# Patient Record
Sex: MALE | Race: ASIAN | Employment: FULL TIME | URBAN - METROPOLITAN AREA
[De-identification: names, ages, dates, MRNs, and addresses within clinical notes are randomized per-mention and may not be internally consistent; named-entity substitution may affect disease eponyms.]

---

## 2017-09-14 ENCOUNTER — OFFICE VISIT (OUTPATIENT)
Dept: FAMILY MEDICINE CLINIC | Age: 57
End: 2017-09-14

## 2017-09-14 VITALS
RESPIRATION RATE: 16 BRPM | WEIGHT: 170 LBS | SYSTOLIC BLOOD PRESSURE: 113 MMHG | HEIGHT: 65 IN | HEART RATE: 68 BPM | TEMPERATURE: 98 F | DIASTOLIC BLOOD PRESSURE: 77 MMHG | OXYGEN SATURATION: 96 % | BODY MASS INDEX: 28.32 KG/M2

## 2017-09-14 DIAGNOSIS — H90.3 SENSORINEURAL HEARING LOSS (SNHL) OF BOTH EARS: ICD-10-CM

## 2017-09-14 DIAGNOSIS — H54.3 VISION LOSS, BILATERAL: ICD-10-CM

## 2017-09-14 DIAGNOSIS — H43.393 VITREOUS FLOATERS OF BOTH EYES: ICD-10-CM

## 2017-09-14 DIAGNOSIS — Z76.89 ESTABLISHING CARE WITH NEW DOCTOR, ENCOUNTER FOR: Primary | ICD-10-CM

## 2017-09-14 DIAGNOSIS — Z12.11 ENCOUNTER FOR FIT (FECAL IMMUNOCHEMICAL TEST) SCREENING: ICD-10-CM

## 2017-09-14 DIAGNOSIS — Z00.00 ENCOUNTER FOR ANNUAL PHYSICAL EXAM: ICD-10-CM

## 2017-09-14 DIAGNOSIS — Z28.21 REFUSED INFLUENZA VACCINE: ICD-10-CM

## 2017-09-14 DIAGNOSIS — Z23 ENCOUNTER FOR IMMUNIZATION: ICD-10-CM

## 2017-09-14 DIAGNOSIS — Z13.220 ENCOUNTER FOR SCREENING FOR LIPID DISORDER: ICD-10-CM

## 2017-09-14 DIAGNOSIS — Z11.59 ENCOUNTER FOR HEPATITIS C SCREENING TEST FOR LOW RISK PATIENT: ICD-10-CM

## 2017-09-14 NOTE — MR AVS SNAPSHOT
Visit Information Date & Time Provider Department Dept. Phone Encounter #  
 9/14/2017  9:15 AM Case Mills MD [address] [place] [address] [phone] [id number] Upcoming Health Maintenance Date Due Hepatitis C Screening 1960 DTaP/Tdap/Td series (1 - Tdap) 10/14/1982 FOBT Q 1 YEAR AGE 50-75 10/14/2011 INFLUENZA AGE 9 TO ADULT 8/1/2017 Allergies as of 9/14/2017  Review Complete On: 9/14/2017 By: Deric Vázquez LPN No Known Allergies Current Immunizations  Reviewed on 9/14/2017 Name Date Tdap 9/14/2017 Reviewed by Deric Vázquez LPN on 1/72/0670 at  9:46 AM  
You Were Diagnosed With   
  
 Codes Comments Encounter for FIT (fecal immunochemical test) screening    -  Primary ICD-10-CM: Z12.11 ICD-9-CM: V76.51 Encounter for immunization     ICD-10-CM: V56 ICD-9-CM: V03.89 Encounter for screening for lipid disorder     ICD-10-CM: Z13.220 ICD-9-CM: V77.91 Encounter for hepatitis C screening test for low risk patient     ICD-10-CM: Z11.59 
ICD-9-CM: V73.89 Vision loss, bilateral     ICD-10-CM: H54.3 ICD-9-CM: 369.3 Vitreous floaters of both eyes     ICD-10-CM: U40.664 ICD-9-CM: 379.24 Vitals BP Pulse Temp Resp Height(growth percentile) Weight(growth percentile) 113/77 68 98 °F (36.7 °C) (Oral) 16 5' 5\" (1.651 m) 170 lb (77.1 kg) SpO2 BMI Smoking Status 96% 28.29 kg/m2 Never Smoker Vitals History BMI and BSA Data Body Mass Index Body Surface Area  
 28.29 kg/m 2 1.88 m 2 Preferred Pharmacy Pharmacy Name Phone [facility] [address] [place] [address] Drive, [address] [place] [address] [address] [facility] [phone] Your Updated Medication List  
  
Notice  As of 9/14/2017  9:47 AM  
 You have not been prescribed any medications. We Performed the Following HEPATITIS C AB [77581 CPT(R)] LIPID PANEL [36954 CPT(R)] OCCULT BLOOD, IMMUNOASSAY (FIT) [id number] CPT(R)] [place] IMMUNIZ ADMIN,1 SINGLE/COMB VAC/TOXOID [id number] CPT(R)] REFERRAL TO OPHTHALMOLOGY [REF57 Custom] Comments:  
 Please evaluate for visual loss, visual floaters. TETANUS, DIPHTHERIA TOXOIDS AND ACELLULAR PERTUSSIS VACCINE (TDAP), IN INDIVIDS. >=7, IM [id number] CPT(R)] Referral Information Referral ID Referred By Referred To  
  
 [id number] IDANIA APARICIO Not Available Visits Status Start Date End Date 1 New Request 9/14/17 9/14/18 If your referral has a status of pending review or denied, additional information will be sent to support the outcome of this decision. Patient Instructions [employer] (multiple locations across [facility]) 
[phone] Please call the clinic back and speak to [facility] after you make the appointment. Well Visit, Men 48 to 72: Care Instructions Your Care Instructions Physical exams can help you stay healthy. Your doctor has checked your overall health and may have suggested ways to take good care of yourself. He or she also may have recommended tests. At home, you can help prevent illness with healthy eating, regular exercise, and other steps. Follow-up care is a key part of your treatment and safety. Be sure to make and go to all appointments, and call your doctor if you are having problems. It's also a good idea to know your test results and keep a list of the medicines you take. How can you care for yourself at home? · Reach and stay at a healthy weight. This will lower your risk for many problems, such as obesity, diabetes, heart disease, and high blood pressure. · Get at least 30 minutes of exercise on most days of the week. Walking is a good choice. You also may want to do other activities, such as running, swimming, cycling, or playing tennis or team sports. · Do not smoke. Smoking can make health problems worse.  If you need help quitting, talk to your doctor about stop-smoking programs and medicines. These can increase your chances of quitting for good. · Protect your skin from too much sun. When you're outdoors from 10 a.m. to 4 p.m., stay in the shade or cover up with clothing and a hat with a wide brim. Wear sunglasses that block UV rays. Even when it's cloudy, put broad-spectrum sunscreen (SPF 30 or higher) on any exposed skin. · See a dentist one or two times a year for checkups and to have your teeth cleaned. · Wear a seat belt in the car. · Limit alcohol to 2 drinks a day. Too much alcohol can cause health problems. Follow your doctor's advice about when to have certain tests. These tests can spot problems early. · Cholesterol. Your doctor will tell you how often to have this done based on your overall health and other things that can increase your risk for heart attack and stroke. · Blood pressure. Have your blood pressure checked during a routine doctor visit. Your doctor will tell you how often to check your blood pressure based on your age, your blood pressure results, and other factors. · Prostate exam. Talk to your doctor about whether you should have a blood test (called a PSA test) for prostate cancer. Experts disagree on whether men should have this test. Some experts recommend that you discuss the benefits and risks of the test with your doctor. · Diabetes. Ask your doctor whether you should have tests for diabetes. · Vision. Some experts recommend that you have yearly exams for glaucoma and other age-related eye problems starting at age 48. · Hearing. Tell your doctor if you notice any change in your hearing. You can have tests to find out how well you hear. · Colon cancer. You should begin tests for colon cancer at age 48. You may have one of several tests. Your doctor will tell you how often to have tests based on your age and risk.  Risks include whether you already had a precancerous polyp removed from your colon or whether your parent, brother, sister, or child has had colon cancer. · Heart attack and stroke risk. At least every 4 to 6 years, you should have your risk for heart attack and stroke assessed. Your doctor uses factors such as your age, blood pressure, cholesterol, and whether you smoke or have diabetes to show what your risk for a heart attack or stroke is over the next 10 years. · Abdominal aortic aneurysm. Ask your doctor whether you should have a test to check for an aneurysm. You may need a test if you ever smoked or if your parent, brother, sister, or child has had an aneurysm. When should you call for help? Watch closely for changes in your health, and be sure to contact your doctor if you have any problems or symptoms that concern you. Where can you learn more? Go to http://andrae-opal.info/. Enter G570 in the search box to learn more about \"Well Visit, Men 48 to 72: Care Instructions. \" Current as of: July 19, 2016 Content Version: 11.3 © 3143-4577 [employer]. Care instructions adapted under license by [employer] (which disclaims liability or warranty for this information). If you have questions about a medical condition or this instruction, always ask your healthcare professional. [place] [address] any warranty or liability for your use of this information. Introducing [facility]! [employer] introduces iovox patient portal. Now you can access parts of your medical record, email your doctor's office, and request medication refills online. 1. In your internet browser, go to [email or web address] 2. Click on the First Time User? Click Here link in the Sign In box. You will see the New Member Sign Up page. 3. Enter your iovox Access Code exactly as it appears below. You will not need to use this code after youve completed the sign-up process.  If you do not sign up before the expiration date, you must request a new code. · Aginova Access Code: [email or web address] Expires: 12/13/2017  9:24 AM 
 
4. Enter the last four digits of your Social Security Number (xxxx) and Date of Birth (mm/dd/yyyy) as indicated and click Submit. You will be taken to the next sign-up page. 5. Create a Aginova ID. This will be your Aginova login ID and cannot be changed, so think of one that is secure and easy to remember. 6. Create a Aginova password. You can change your password at any time. 7. Enter your Password Reset Question and Answer. This can be used at a later time if you forget your password. 8. Enter your e-mail address. You will receive e-mail notification when new information is available in [address] 9. Click Sign Up. You can now view and download portions of your medical record. 10. Click the Download Summary menu link to download a portable copy of your medical information. If you have questions, please visit the Frequently Asked Questions section of the Aginova website. Remember, Aginova is NOT to be used for urgent needs. For medical emergencies, dial 911. Now available from your iPhone and Android! Please provide this summary of care documentation to your next provider. Your primary care clinician is listed as NONE. If you have any questions after today's visit, please call [phone].

## 2017-09-14 NOTE — PROGRESS NOTES
[address] [facility] with [facility] and [facility]       Subjective  Jeremias Denise is an 55 y.o. male who presents for complete physical exam and to establish care with our practice. He was previously followed by Dr. Liane Pelayo. Doing well overall. Complaining of some bilateral visual loss and sensation of floaters in his eyes. Asking for a referral to see ophthalmologist--has not seen in the past.  Also complaining of bilateral hearing loss--noted by Dr. Liane Pelayo in the past, patient thought to be an appropriate candidate for hearing aids. Diet: good. Eats plenty of fruits and vegetables. Rarely eats fast/processed foods. Exercise: walks 2 miles daily. Allergies - reviewed:   No Known Allergies      Medications - reviewed:  No current outpatient prescriptions on file. No current facility-administered medications for this visit. Past Medical History - reviewed:  Past Medical History:   Diagnosis Date    Sensorineural hearing loss          Past Surgical History - reviewed:  History reviewed. No pertinent surgical history. Family History - reviewed:  History reviewed. No pertinent family history. Social History - reviewed:  Social History     Social History    Marital status:      Spouse name: N/A    Number of children: N/A    Years of education: N/A     Occupational History    Not on file. Social History Main Topics    Smoking status: Never Smoker    Smokeless tobacco: Never Used    Alcohol use No    Drug use: Not on file    Sexual activity: Yes     Partners: Female      Comment:   Cheondoism Ukrainian      Other Topics Concern    Not on file     Social History Narrative         Immunizations - reviewed:   Immunization History   Administered Date(s) Administered    Tdap 09/14/2017     Flu: declines today. Tdap: Does not remember last one. To receive today.   Pneumovax: n/a  Zostervax: n/a      Health Maintenance reviewed -  Colonoscopy declined in the past, declines again today. Agreeable to FIT testing. DEXA scan n/a  Hepatitis C testing to do today. Lung cancer screening n/a      Review of Systems  Review of Systems   Constitutional: Negative for chills and fever. Respiratory: Negative for shortness of breath. Cardiovascular: Negative for chest pain. Physical Exam    Visit Vitals    /77    Pulse 68    Temp 98 °F (36.7 °C) (Oral)    Resp 16    Ht 5' 5\" (1.651 m)    Wt 170 lb (77.1 kg)    SpO2 96%    BMI 28.29 kg/m2     Physical Exam   Constitutional: He is oriented to person, place, and time. He appears well-developed and well-nourished. No distress. HENT:   Head: Normocephalic. Right Ear: Hearing, external ear and ear canal normal. Tympanic membrane is scarred. Left Ear: Hearing, external ear and ear canal normal. Tympanic membrane is scarred. Eyes: Conjunctivae, EOM and lids are normal. Pupils are equal, round, and reactive to light. Lids are everted and swept, no foreign bodies found. Fundoscopic exam:       The right eye shows no arteriolar narrowing, no AV nicking, no exudate, no hemorrhage and no papilledema. The right eye shows red reflex. The left eye shows no arteriolar narrowing, no AV nicking, no exudate, no hemorrhage and no papilledema. The left eye shows red reflex. Neck: Normal range of motion. Cardiovascular: Normal rate, regular rhythm and intact distal pulses. Exam reveals no gallop and no friction rub. No murmur heard. Pulmonary/Chest: Effort normal and breath sounds normal. No respiratory distress. He has no wheezes. He has no rales. He exhibits no tenderness. Abdominal: Soft. Bowel sounds are normal. He exhibits no distension and no mass. There is no tenderness. There is no rebound and no guarding. Lymphadenopathy:     He has no cervical adenopathy. Neurological: He is alert and oriented to person, place, and time. No cranial nerve deficit.    Skin: Skin is warm and dry. He is not diaphoretic. Psychiatric: He has a normal mood and affect. Vitals reviewed. Assessment:       ICD-10-CM ICD-9-CM    1. Establishing care with new doctor, encounter for Z71.89 V65.8    2. Encounter for FIT (fecal immunochemical test) screening Z12.11 V76.51 OCCULT BLOOD, IMMUNOASSAY (FIT)   3. Encounter for immunization Z23 V03.89 [place] IMMUNIZ ADMIN,1 SINGLE/COMB VAC/TOXOID      TETANUS, DIPHTHERIA TOXOIDS AND ACELLULAR PERTUSSIS VACCINE (TDAP), IN INDIVIDS. >=7, IM   4. Encounter for screening for lipid disorder Z13.220 V77.91 LIPID PANEL   5. Encounter for hepatitis C screening test for low risk patient Z11.59 V73.89 HEPATITIS C AB   6. Vision loss, bilateral H54.3 369.3 REFERRAL TO OPHTHALMOLOGY   7. Vitreous floaters of both eyes H43.393 379.24 REFERRAL TO OPHTHALMOLOGY   8. Refused influenza vaccine Z28.21 V64.06    9. Encounter for annual physical exam Z00.00 V70.0        Plan:   · Counseled re: diet, exercise, healthy lifestyle. Patient is already compliant with these measures. · Appropriate labs, vaccines, imaging studies, and referrals ordered as listed above. Ordered FIT testing as patient refused colonoscopy. If FIT testing positive, will strongly recommend colonoscopy. · Referral to opthalmology for visual loss, vitreous floaters. Patient instructed to call Mikayla Haines once he has appointment. · Hearing loss:  Bilateral scarred TMs. Patient not intersted in further evaluation of this issue or hearing aids. Will continue to monitor and be available if dayan decides to use hearing aids in the future. Follow-up Disposition: Not on File    I have discussed the diagnosis with the patient and the intended plan as seen in the above orders. The patient has received an after-visit summary and questions were answered concerning future plans. I have discussed medication side effects and warnings with the patient as well.  Informed pt to return to the office if new symptoms arise. Patient was discussed with Dr. Nini Ann.     Shania Barclay MD  Family Medicine Resident

## 2017-09-14 NOTE — PATIENT INSTRUCTIONS
[facility] (multiple locations across [facility])  [phone]    Please call the clinic back and speak to [facility] after you make the appointment. Well Visit, Men 48 to 72: Care Instructions  Your Care Instructions  Physical exams can help you stay healthy. Your doctor has checked your overall health and may have suggested ways to take good care of yourself. He or she also may have recommended tests. At home, you can help prevent illness with healthy eating, regular exercise, and other steps. Follow-up care is a key part of your treatment and safety. Be sure to make and go to all appointments, and call your doctor if you are having problems. It's also a good idea to know your test results and keep a list of the medicines you take. How can you care for yourself at home? · Reach and stay at a healthy weight. This will lower your risk for many problems, such as obesity, diabetes, heart disease, and high blood pressure. · Get at least 30 minutes of exercise on most days of the week. Walking is a good choice. You also may want to do other activities, such as running, swimming, cycling, or playing tennis or team sports. · Do not smoke. Smoking can make health problems worse. If you need help quitting, talk to your doctor about stop-smoking programs and medicines. These can increase your chances of quitting for good. · Protect your skin from too much sun. When you're outdoors from 10 a.m. to 4 p.m., stay in the shade or cover up with clothing and a hat with a wide brim. Wear sunglasses that block UV rays. Even when it's cloudy, put broad-spectrum sunscreen (SPF 30 or higher) on any exposed skin. · See a dentist one or two times a year for checkups and to have your teeth cleaned. · Wear a seat belt in the car. · Limit alcohol to 2 drinks a day. Too much alcohol can cause health problems. Follow your doctor's advice about when to have certain tests.  These tests can spot problems early.  · Cholesterol. Your doctor will tell you how often to have this done based on your overall health and other things that can increase your risk for heart attack and stroke. · Blood pressure. Have your blood pressure checked during a routine doctor visit. Your doctor will tell you how often to check your blood pressure based on your age, your blood pressure results, and other factors. · Prostate exam. Talk to your doctor about whether you should have a blood test (called a PSA test) for prostate cancer. Experts disagree on whether men should have this test. Some experts recommend that you discuss the benefits and risks of the test with your doctor. · Diabetes. Ask your doctor whether you should have tests for diabetes. · Vision. Some experts recommend that you have yearly exams for glaucoma and other age-related eye problems starting at age 48. · Hearing. Tell your doctor if you notice any change in your hearing. You can have tests to find out how well you hear. · Colon cancer. You should begin tests for colon cancer at age 48. You may have one of several tests. Your doctor will tell you how often to have tests based on your age and risk. Risks include whether you already had a precancerous polyp removed from your colon or whether your parent, brother, sister, or child has had colon cancer. · Heart attack and stroke risk. At least every 4 to 6 years, you should have your risk for heart attack and stroke assessed. Your doctor uses factors such as your age, blood pressure, cholesterol, and whether you smoke or have diabetes to show what your risk for a heart attack or stroke is over the next 10 years. · Abdominal aortic aneurysm. Ask your doctor whether you should have a test to check for an aneurysm. You may need a test if you ever smoked or if your parent, brother, sister, or child has had an aneurysm. When should you call for help?   Watch closely for changes in your health, and be sure to contact your doctor if you have any problems or symptoms that concern you. Where can you learn more? Go to http://andrae-opal.info/. Enter J885 in the search box to learn more about \"Well Visit, Men 48 to 72: Care Instructions. \"  Current as of: July 19, 2016  Content Version: 11.3  © 5840-6310 [employer]. Care instructions adapted under license by [employer] (which disclaims liability or warranty for this information). If you have questions about a medical condition or this instruction, always ask your healthcare professional. [place] [address] any warranty or liability for your use of this information.

## 2017-09-14 NOTE — PROGRESS NOTES
Chief Complaint   Patient presents with    Establish Care    Eye Problem     both right and left eyes cannot see clearly . .patient states in right eye seeing black dots. .. possible referral for eyes     1. Have you been to the ER, urgent care clinic since your last visit? Hospitalized since your last visit? No    2. Have you seen or consulted any other health care providers outside of the [address] [place] [address] since your last visit? Include any pap smears or colon screening.  No

## 2017-09-15 ENCOUNTER — TELEPHONE (OUTPATIENT)
Dept: FAMILY MEDICINE CLINIC | Age: 57
End: 2017-09-15

## 2017-09-15 LAB
CHOLEST SERPL-MCNC: 191 MG/DL (ref 100–199)
HCV AB S/CO SERPL IA: <0.1 S/CO RATIO (ref 0–0.9)
HDLC SERPL-MCNC: 32 MG/DL
INTERPRETATION, 910389: NORMAL
LDLC SERPL CALC-MCNC: ABNORMAL MG/DL (ref 0–99)
PDF IMAGE, 910387: NORMAL
TRIGL SERPL-MCNC: 436 MG/DL (ref 0–149)
VLDLC SERPL CALC-MCNC: ABNORMAL MG/DL (ref 5–40)

## 2017-09-15 NOTE — TELEPHONE ENCOUNTER
Patient son calling and states insurance company requesting referral for visit here 9/14/17. Appears patient have HMO and Dr. Florence Cage name is listed on card. I did tell the son to have pcp changed ASAP to Dr. Sanchez Ear is he will continue to be seen here. Son will contact insurance to have updated.

## 2017-09-15 NOTE — TELEPHONE ENCOUNTER
Have informed this patient twice about calling his insurance company [employer]), to change his primary care physician to one in this office. ... He said that he is not understanding completely about what I am asking him to do, so he said he will call his son (annabelle) & have him to call & speak with me about this. ... Told patient I am here until 4:30 pm & that we need to get this done before days end. ... He said that he understand & will call his son. ... I tried telling him that he will be responsible for payment of his appointment today at the optometrist....

## 2017-09-16 ENCOUNTER — TELEPHONE (OUTPATIENT)
Dept: FAMILY MEDICINE CLINIC | Age: 57
End: 2017-09-16

## 2017-09-16 DIAGNOSIS — Z13.220 SCREENING FOR LIPID DISORDERS: Primary | ICD-10-CM

## 2017-09-16 NOTE — PROGRESS NOTES
Result reviewed. Markedly elevated triglycerides--assured me that he only had a \"few grapes\" prior to lab draw. Will have patient return for fasting labs.

## 2017-09-16 NOTE — TELEPHONE ENCOUNTER
Noted markedly elevated triglycerides. Difficult to interpret lipid results. Patient likely not fasting. Please call and schedule the patient to come in for fasting labs. I will order these now for future lab draw.

## 2017-09-18 NOTE — TELEPHONE ENCOUNTER
Attempted to call patient per Dr. Dayna Dunn to schedule patient a lab only appointment for fasting labs. Left voicemail for patient to return call.

## 2017-09-19 NOTE — TELEPHONE ENCOUNTER
Patients referral has been submitted to [facility] & faxed a copy to Dr Perez Galdamez (opht). ...

## 2017-09-19 NOTE — TELEPHONE ENCOUNTER
Spoke with patient per Dr. Cy Townsend to schedule a lab only appt for fasting labs.  Patient verbalized understanding and was scheduled on 9/20/17 at 8:00am.

## 2017-09-20 ENCOUNTER — LAB ONLY (OUTPATIENT)
Dept: FAMILY MEDICINE CLINIC | Age: 57
End: 2017-09-20

## 2017-09-20 NOTE — MR AVS SNAPSHOT
Visit Information Date & Time Provider Department Dept. Phone Encounter #  
 9/20/2017  8:00 AM LAB [facility] [phone] [id number] Your Appointments 9/21/2017  8:15 AM  
LAB with LAB [facility] ([address]) Appt Note: fasting labs 3300 [facility] 3 1007 [facility]  
[phone]  
  
   
 [address] [place] [address] 3 [facility] 99 [id number] Upcoming Health Maintenance Date Due FOBT Q 1 YEAR AGE 50-75 10/14/2011 DTaP/Tdap/Td series (2 - Td) 9/14/2027 Allergies as of 9/20/2017  Review Complete On: 9/14/2017 By: Tory Suarez MD  
 No Known Allergies Current Immunizations  Reviewed on 9/14/2017 Name Date Tdap 9/14/2017 Not reviewed this visit Vitals Smoking Status Never Smoker Preferred Pharmacy Pharmacy Name Phone [facility] [address] [place] [address] Drive, [address] [facility] [phone] Your Updated Medication List  
  
Notice  As of 9/20/2017  4:33 PM  
 You have not been prescribed any medications. Introducing [facility] SERVICES! [facility] introduces [employer] patient portal. Now you can access parts of your medical record, email your doctor's office, and request medication refills online. 1. In your internet browser, go to [email or web address] 2. Click on the First Time User? Click Here link in the Sign In box. You will see the New Member Sign Up page. 3. Enter your [employer] Access Code exactly as it appears below. You will not need to use this code after youve completed the sign-up process. If you do not sign up before the expiration date, you must request a new code. · [employer] Access Code: [email or web address] Expires: 12/13/2017  9:24 AM 
 
4. Enter the last four digits of your Social Security Number (xxxx) and Date of Birth (mm/dd/yyyy) as indicated and click Submit.  You will be taken to the next sign-up page. 5. Create a FamilyFinds ID. This will be your FamilyFinds login ID and cannot be changed, so think of one that is secure and easy to remember. 6. Create a [employer]Finds password. You can change your password at any time. 7. Enter your Password Reset Question and Answer. This can be used at a later time if you forget your password. 8. Enter your e-mail address. You will receive e-mail notification when new information is available in [address] 9. Click Sign Up. You can now view and download portions of your medical record. 10. Click the Download Summary menu link to download a portable copy of your medical information. If you have questions, please visit the Frequently Asked Questions section of the [employer]Finds website. Remember, [employer]Finds is NOT to be used for urgent needs. For medical emergencies, dial 911. Now available from your iPhone and Android! Please provide this summary of care documentation to your next provider. Your primary care clinician is listed as Charmaine Myers. If you have any questions after today's visit, please call [phone].

## 2017-09-21 ENCOUNTER — LAB ONLY (OUTPATIENT)
Dept: FAMILY MEDICINE CLINIC | Age: 57
End: 2017-09-21

## 2017-09-21 DIAGNOSIS — Z13.220 SCREENING FOR LIPID DISORDERS: ICD-10-CM

## 2017-09-22 LAB
CHOLEST SERPL-MCNC: 168 MG/DL (ref 100–199)
HDLC SERPL-MCNC: 33 MG/DL
INTERPRETATION, 910389: NORMAL
LDLC SERPL CALC-MCNC: 88 MG/DL (ref 0–99)
TRIGL SERPL-MCNC: 236 MG/DL (ref 0–149)
VLDLC SERPL CALC-MCNC: 47 MG/DL (ref 5–40)

## 2017-09-22 NOTE — PROGRESS NOTES
Results reviewed. Low HDL. Letter sent to patient--work on diet and exercise to improve this number. Will check in 1 year. No benefit to correcting elevated TG. Looks like he has had this for a while.

## 2021-04-24 ENCOUNTER — OFFICE VISIT (OUTPATIENT)
Dept: URGENT CARE | Age: 61
End: 2021-04-24
Payer: MEDICAID

## 2021-04-24 VITALS — HEART RATE: 79 BPM | OXYGEN SATURATION: 97 % | TEMPERATURE: 98.2 F | RESPIRATION RATE: 16 BRPM

## 2021-04-24 DIAGNOSIS — Z20.822 ENCOUNTER FOR LABORATORY TESTING FOR COVID-19 VIRUS: Primary | ICD-10-CM

## 2021-04-24 PROCEDURE — 99202 OFFICE O/P NEW SF 15 MIN: CPT | Performed by: FAMILY MEDICINE

## 2021-04-24 NOTE — PROGRESS NOTES
This patient was seen at [address] [place] [address] Urgent Care while in their vehicle due to COVID-19 pandemic with PPE and focused examination in order to decrease community viral transmission. The patient/guardian gave verbal consent to treat. The history is provided by the patient. Asymptomatic  Need PCR covid test for travelling     Past Medical History:   Diagnosis Date    Sensorineural hearing loss         History reviewed. No pertinent surgical history. History reviewed. No pertinent family history.      Social History     Socioeconomic History    Marital status:      Spouse name: Not on file    Number of children: Not on file    Years of education: Not on file    Highest education level: Not on file   Occupational History    Not on file   Social Needs    Financial resource strain: Not on file    Food insecurity     Worry: Not on file     Inability: Not on file    Transportation needs     Medical: Not on file     Non-medical: Not on file   Tobacco Use    Smoking status: Never Smoker    Smokeless tobacco: Never Used   Substance and Sexual Activity    Alcohol use: No    Drug use: Not on file    Sexual activity: Yes     Partners: Female     Comment:   [facility]   Lifestyle    Physical activity     Days per week: Not on file     Minutes per session: Not on file    Stress: Not on file   Relationships    Social connections     Talks on phone: Not on file     Gets together: Not on file     Attends Druze service: Not on file     Active member of club or organization: Not on file     Attends meetings of clubs or organizations: Not on file     Relationship status: Not on file    Intimate partner violence     Fear of current or ex partner: Not on file     Emotionally abused: Not on file     Physically abused: Not on file     Forced sexual activity: Not on file   Other Topics Concern    Not on file   Social History Narrative    Not on file ALLERGIES: Patient has no known allergies. Review of Systems   All other systems reviewed and are negative. Vitals:    04/24/21 1638   Pulse: 79   Resp: 16   Temp: 98.2 °F (36.8 °C)   SpO2: 97%       Physical Exam  Vitals signs and nursing note reviewed. Constitutional:       General: He is not in acute distress. Appearance: He is not ill-appearing. Pulmonary:      Effort: Pulmonary effort is normal. No respiratory distress. Breath sounds: Normal breath sounds. MDM    Procedures      ICD-10-CM ICD-9-CM    1. Encounter for laboratory testing for COVID-19 virus  Z20.822 V01.79 NOVEL CORONAVIRUS (COVID-19)     No orders of the defined types were placed in this encounter. No results found for any visits on 04/24/21. The patients condition was discussed with the patient and they understand. The patient is to follow up with primary care doctor. If signs and symptoms become worse the pt is to go to the ER. The patient is to take medications as prescribed.

## 2021-04-24 NOTE — LETTER
April 24, 2021 [facility] [address] [facility] [id number] Dear Whitney Rajan: 
Thank you for requesting access to [employer]. Please follow the instructions below to securely access and download your online medical record. [employer] allows you to send messages to your doctor, view your test results, renew your prescriptions, schedule appointments, and more. How Do I Sign Up? 1. In your internet browser, go to [email or web address]. 2. Click on the First Time User? Click Here link in the Sign In box. You will see the New Member Sign Up page. 3. Enter your [employer] Access Code exactly as it appears below. You will not need to use this code after youve completed the sign-up process. If you do not sign up before the expiration date, you must request a new code. [employer] Access Code: [email or web address] Expires: 6/8/2021  4:15 PM  
 
4. Enter the last four digits of your Social Security Number (xxxx) and Date of Birth (mm/dd/yyyy) as indicated and click Submit. You will be taken to the next sign-up page. 5. Create a [employer]IT ID. This will be your [employer] login ID and cannot be changed, so think of one that is secure and easy to remember. 6. Create a [employer]IT password. You can change your password at any time. 7. Enter your Password Reset Question and Answer. This can be used at a later time if you forget your password. 8. Enter your e-mail address. You will receive e-mail notification when new information is available in [address] 9. Click Sign Up. You can now view and download portions of your medical record. 10. Click the Download Summary menu link to download a portable copy of your medical information. Additional Information If you have questions, please visit the Frequently Asked Questions section of the [employer] website at [email or web address] Remember, [employer] is NOT to be used for urgent needs. For medical emergencies, dial 911.  
 
Now available from your iPhone and Android! Sincerely, [employer]

## 2021-04-27 LAB — SARS-COV-2, NAA: NOT DETECTED

## 2021-04-30 ENCOUNTER — OFFICE VISIT (OUTPATIENT)
Dept: URGENT CARE | Age: 61
End: 2021-04-30
Payer: MEDICAID

## 2021-04-30 VITALS — HEART RATE: 78 BPM | OXYGEN SATURATION: 97 % | RESPIRATION RATE: 14 BRPM | TEMPERATURE: 98.3 F

## 2021-04-30 DIAGNOSIS — Z20.822 ENCOUNTER FOR LABORATORY TESTING FOR COVID-19 VIRUS: Primary | ICD-10-CM

## 2021-04-30 PROCEDURE — 99213 OFFICE O/P EST LOW 20 MIN: CPT | Performed by: NURSE PRACTITIONER

## 2021-04-30 NOTE — PROGRESS NOTES
This patient was seen at [address] [place] [address] Urgent Care while in their vehicle due to COVID-19 pandemic with PPE and focused examination in order to decrease community viral transmission. The patient/guardian gave verbal consent to treat. Sonja Michaud is a 62 y.o. male who presents for COVID-19 testing. Patient will be traveling to [facility] in three days and needs negative COVID PCR prior to travel. Denies any symptoms such as cough, SOB, chest tightness, congestion, ST, HA, n/v/d, fever etc. No known exposure to COVID or sick contacts. No other complaints or concerns at this time. PMH: None. Non-smoker. The history is provided by the patient. Past Medical History:   Diagnosis Date    Sensorineural hearing loss         History reviewed. No pertinent surgical history. History reviewed. No pertinent family history.      Social History     Socioeconomic History    Marital status:      Spouse name: Not on file    Number of children: Not on file    Years of education: Not on file    Highest education level: Not on file   Occupational History    Not on file   Social Needs    Financial resource strain: Not on file    Food insecurity     Worry: Not on file     Inability: Not on file    Transportation needs     Medical: Not on file     Non-medical: Not on file   Tobacco Use    Smoking status: Never Smoker    Smokeless tobacco: Never Used   Substance and Sexual Activity    Alcohol use: No    Drug use: Not on file    Sexual activity: Yes     Partners: Female     Comment:   [facility]   Lifestyle    Physical activity     Days per week: Not on file     Minutes per session: Not on file    Stress: Not on file   Relationships    Social connections     Talks on phone: Not on file     Gets together: Not on file     Attends Anabaptist service: Not on file     Active member of club or organization: Not on file     Attends meetings of clubs or organizations: Not on file Relationship status: Not on file    Intimate partner violence     Fear of current or ex partner: Not on file     Emotionally abused: Not on file     Physically abused: Not on file     Forced sexual activity: Not on file   Other Topics Concern    Not on file   Social History Narrative    Not on file                ALLERGIES: Patient has no known allergies. Review of Systems   Constitutional: Negative for activity change, appetite change, chills, diaphoresis, fatigue and fever. HENT: Negative for congestion, ear pain, postnasal drip, rhinorrhea, sinus pressure, sinus pain and sore throat. Respiratory: Negative for cough, chest tightness, shortness of breath and wheezing. Cardiovascular: Negative for chest pain. Gastrointestinal: Negative for abdominal pain, diarrhea, nausea and vomiting. Musculoskeletal: Negative for myalgias. Skin: Negative for rash. Neurological: Negative for dizziness, light-headedness and headaches. Vitals:    04/30/21 1214   Pulse: 78   Resp: 14   Temp: 98.3 °F (36.8 °C)   SpO2: 97%       Physical Exam  Vitals signs and nursing note reviewed. Constitutional:       General: He is not in acute distress. Appearance: Normal appearance. He is not ill-appearing. HENT:      Head: Normocephalic and atraumatic. Eyes:      Conjunctiva/sclera: Conjunctivae normal.      Pupils: Pupils are equal, round, and reactive to light. Neck:      Musculoskeletal: Normal range of motion and neck supple. Cardiovascular:      Rate and Rhythm: Normal rate. Pulmonary:      Effort: Pulmonary effort is normal.   Musculoskeletal: Normal range of motion. Skin:     General: Skin is warm and dry. Findings: No rash. Neurological:      Mental Status: He is alert and oriented to person, place, and time. Psychiatric:         Mood and Affect: Mood normal.         Thought Content: Thought content normal.         MDM    Procedures      ICD-10-CM ICD-9-CM   1.  Encounter for laboratory testing for COVID-19 virus  Z20.822 V01.79       Orders Placed This Encounter    NOVEL CORONAVIRUS (COVID-19) ([facility] Default)     Scheduling Instructions:      1) Due to current limited availability of the COVID-19 PCR test, tests will be prioritized and may not be completed.              2) Order only if the test result will change clinical management or necessary for a return to mission-critical employment decision.              3) Print and instruct patient to adhere to CDC home isolation program. (Link Above)              4) Set up or refer patient for a monitoring program.              5) Have patient sign up for and leverage [email or web address] (if not previously done). Order Specific Question:   Is this test for diagnosis or screening? Answer:   Screening     Order Specific Question:   Symptomatic for COVID-19 as defined by CDC? Answer:   No     Order Specific Question:   Hospitalized for COVID-19? Answer:   No     Order Specific Question:   Admitted to ICU for COVID-19? Answer:   No     Order Specific Question:   Employed in healthcare setting? Answer:   Unknown     Order Specific Question:   Resident in a congregate (group) care setting? Answer:   Unknown     Order Specific Question:   Previously tested for COVID-19? Answer:   Yes      Quarantine recommendations reviewed per CDC guidelines. Encouraged deep breathing exercises, ambulation, Tylenol prn- should symptoms develop. Increase fluids with electrolytes    The patient is to follow up with PCP PRN. If signs and symptoms become worse the pt is to go to the ER.      Signed By: Jovita Barclay NP     April 30, 2021

## 2021-05-02 LAB
SARS-COV-2, NAA 2 DAY TAT: NORMAL
SARS-COV-2, NAA: NOT DETECTED